# Patient Record
Sex: MALE | Race: WHITE | Employment: STUDENT | URBAN - METROPOLITAN AREA
[De-identification: names, ages, dates, MRNs, and addresses within clinical notes are randomized per-mention and may not be internally consistent; named-entity substitution may affect disease eponyms.]

---
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
## 2024-04-27 ENCOUNTER — HOSPITAL ENCOUNTER (EMERGENCY)
Age: 24
Discharge: HOME | End: 2024-04-27
Payer: COMMERCIAL

## 2024-04-27 VITALS
OXYGEN SATURATION: 100 % | DIASTOLIC BLOOD PRESSURE: 95 MMHG | TEMPERATURE: 98.4 F | HEART RATE: 109 BPM | SYSTOLIC BLOOD PRESSURE: 140 MMHG | HEIGHT: 68 IN | RESPIRATION RATE: 20 BRPM | BODY MASS INDEX: 37.13 KG/M2 | WEIGHT: 245 LBS

## 2024-04-27 DIAGNOSIS — S61.012A LACERATION OF LEFT THUMB WITHOUT FOREIGN BODY WITHOUT DAMAGE TO NAIL, INITIAL ENCOUNTER: Primary | ICD-10-CM

## 2024-04-27 DIAGNOSIS — S61.213A LACERATION OF LEFT MIDDLE FINGER WITHOUT FOREIGN BODY WITHOUT DAMAGE TO NAIL, INITIAL ENCOUNTER: ICD-10-CM

## 2024-04-27 PROCEDURE — 99284 EMERGENCY DEPT VISIT MOD MDM: CPT

## 2024-04-27 PROCEDURE — 12002 RPR S/N/AX/GEN/TRNK2.6-7.5CM: CPT

## 2024-04-27 PROCEDURE — 6360000002 HC RX W HCPCS

## 2024-04-27 PROCEDURE — 99282 EMERGENCY DEPT VISIT SF MDM: CPT

## 2024-04-27 PROCEDURE — 90715 TDAP VACCINE 7 YRS/> IM: CPT

## 2024-04-27 PROCEDURE — 90471 IMMUNIZATION ADMIN: CPT

## 2024-04-27 RX ADMIN — TETANUS TOXOID, REDUCED DIPHTHERIA TOXOID AND ACELLULAR PERTUSSIS VACCINE, ADSORBED 0.5 ML: 5; 2.5; 8; 8; 2.5 SUSPENSION INTRAMUSCULAR at 15:58

## 2024-04-27 ASSESSMENT — PAIN DESCRIPTION - LOCATION: LOCATION: HAND

## 2024-04-27 ASSESSMENT — LIFESTYLE VARIABLES: HOW OFTEN DO YOU HAVE A DRINK CONTAINING ALCOHOL: NEVER

## 2024-04-27 ASSESSMENT — PAIN - FUNCTIONAL ASSESSMENT: PAIN_FUNCTIONAL_ASSESSMENT: 0-10

## 2024-04-27 ASSESSMENT — PAIN DESCRIPTION - ORIENTATION: ORIENTATION: LEFT

## 2024-04-27 NOTE — DISCHARGE INSTRUCTIONS
Have stitches removed in 10 to 14 days.  Monitor for signs of infection as discussed.  Do not get stitches wet for the first 24 hours, then clean wounds twice a day with soap and water.  You can use antibiotic ointment like Neosporin to prevent infection.  Call your primary care provider to schedule a follow-up appointment to get stitches removed.  Return to the emergency department for any new or worsening symptoms or signs of infection.

## 2024-04-27 NOTE — ED NOTES
Cleansed two left hand lacs with soap and warm water. Patient tolerated well. Bleeding controlled. Gauze applied while patient waits for provider to suture.

## 2024-04-27 NOTE — ED TRIAGE NOTES
Pt to ed from home via triage with lac to left hand.   Pt reports that he cut hand with sharp can  Pt denies other injury  Lac noted to left thumb and middle finger neat tip of palm surface

## 2024-04-27 NOTE — ED PROVIDER NOTES
[facility] ED  eMERGENCYdEPARTMENT eNCOUnter        Pt Name: Eliseo Garrett  MRN: [id number]  Birthdate 2000of evaluation: 4/27/2024  Provider:Irina Mojica PA-C  3:48 PM EDT    CHIEF COMPLAINT       Chief Complaint   Patient presents with    Laceration     Left hand thumb and middle finger,          HISTORY OF PRESENT ILLNESS  (Location/Symptom, Timing/Onset, Context/Setting, Quality, Duration, Modifying Factors, Severity.)   Eliseo Garrett is a 23 y.o. male who presents to the emergency department for evaluation of lacerations to left tips thumb and middle finger.  Patient was using a can opener at home and cut fingers on it just prior to arrival. Bleeding is controlled with direct pressure.  Denies any numbness or tingling to fingers.  Patient is unsure when his last tetanus shot was.  Denies any other complaints at this time.    HPI    Nursing Notes were reviewed and I agree.    REVIEW OF SYSTEMS    (2-9 systems for level 4, 10 or more for level 5)     Review of Systems   Skin:  Positive for wound.        2 lacerations   All other systems reviewed and are negative.       as noted above the remainder of the review of systems was reviewed and negative.       PAST MEDICAL HISTORY     Past Medical History:   Diagnosis Date    Asthma          SURGICAL HISTORY       Past Surgical History:   Procedure Laterality Date    TONSILLECTOMY           CURRENT MEDICATIONS       Previous Medications    METHYLPREDNISOLONE (MEDROL, GOLDY,) 4 MG TABLET    Take by mouth.    TOBRAMYCIN (TOBREX) 0.3 % OPHTHALMIC SOLUTION    Place 1 drop into both eyes every 4 hours       ALLERGIES     Patient has no known allergies.    HISTORY     History reviewed. No pertinent family history.       SOCIAL HISTORY       Social History     Socioeconomic History    Marital status: Single     Spouse name: None    Number of children: None    Years of education: None    Highest education level: None   Tobacco Use    Smoking status: Never